# Patient Record
Sex: FEMALE | Race: WHITE | URBAN - METROPOLITAN AREA
[De-identification: names, ages, dates, MRNs, and addresses within clinical notes are randomized per-mention and may not be internally consistent; named-entity substitution may affect disease eponyms.]

---

## 2021-07-22 ENCOUNTER — OFFICE VISIT (OUTPATIENT)
Dept: URBAN - METROPOLITAN AREA CLINIC 115 | Facility: CLINIC | Age: 21
End: 2021-07-22
Payer: COMMERCIAL

## 2021-07-22 ENCOUNTER — WEB ENCOUNTER (OUTPATIENT)
Dept: URBAN - METROPOLITAN AREA CLINIC 115 | Facility: CLINIC | Age: 21
End: 2021-07-22

## 2021-07-22 DIAGNOSIS — R10.31 RIGHT LOWER QUADRANT PAIN: ICD-10-CM

## 2021-07-22 DIAGNOSIS — R74.8 ELEVATED LIVER ENZYMES: ICD-10-CM

## 2021-07-22 DIAGNOSIS — E66.9 OBESITY (BMI 30-39.9): ICD-10-CM

## 2021-07-22 DIAGNOSIS — K21.9 GASTROESOPHAGEAL REFLUX DISEASE WITHOUT ESOPHAGITIS: ICD-10-CM

## 2021-07-22 PROCEDURE — G9903 PT SCRN TBCO ID AS NON USER: HCPCS | Performed by: INTERNAL MEDICINE

## 2021-07-22 PROCEDURE — 99244 OFF/OP CNSLTJ NEW/EST MOD 40: CPT | Performed by: INTERNAL MEDICINE

## 2021-07-22 PROCEDURE — 1036F TOBACCO NON-USER: CPT | Performed by: INTERNAL MEDICINE

## 2021-07-22 PROCEDURE — G8417 CALC BMI ABV UP PARAM F/U: HCPCS | Performed by: INTERNAL MEDICINE

## 2021-07-22 PROCEDURE — G8427 DOCREV CUR MEDS BY ELIG CLIN: HCPCS | Performed by: INTERNAL MEDICINE

## 2021-07-22 RX ORDER — OMEPRAZOLE 10 MG/1
1 CAPSULE 30 MINUTES BEFORE MORNING MEAL CAPSULE, DELAYED RELEASE ORAL ONCE A DAY
Status: ACTIVE | COMMUNITY

## 2021-07-22 RX ORDER — DICYCLOMINE HYDROCHLORIDE 10 MG/1
1 TABLET CAPSULE ORAL THREE TIMES A DAY
Qty: 90 | Refills: 1 | OUTPATIENT
Start: 2021-07-22 | End: 2021-09-20

## 2021-07-22 NOTE — HPI-TODAY'S VISIT:
07/22/2021 Patient is a 20 year old   female who presents on referral from Dr. Jack Claros for evaluation of abdominal pain. A copy of the note will be sent to the referring provider. Patient c/o RLQ abdominal pain for the past 2 months, and she reports labs with her PCP showed abnormal liver enzymes. She is scheduled to have follow up with Dr. Claros to discuss lab results. The abdominal pain significantly improved on omeprazole for the past month. Without omeprazole, she has a recurrence of pain and also bowel urgency. No family history of colon cancer, stomach cancer, gallbladder conditions, Crohn's disease, or gluten intolerance. Patient admits lactose intolerance and certain brands of foods cause her irritation. Denies any melena or hematochezia. No diarrhea or constipation.

## 2021-07-22 NOTE — PHYSICAL EXAM GASTROINTESTINAL
Abdomen , soft, nontender, nondistended , no guarding or rigidity , no masses palpable , hyperactive bowel sounds , Liver and Spleen , no hepatomegaly present , no hepatosplenomegaly , liver nontender , spleen not palpable

## 2021-12-02 ENCOUNTER — OFFICE VISIT (OUTPATIENT)
Dept: URBAN - METROPOLITAN AREA CLINIC 115 | Facility: CLINIC | Age: 21
End: 2021-12-02
Payer: COMMERCIAL

## 2021-12-02 ENCOUNTER — LAB OUTSIDE AN ENCOUNTER (OUTPATIENT)
Dept: URBAN - METROPOLITAN AREA CLINIC 115 | Facility: CLINIC | Age: 21
End: 2021-12-02

## 2021-12-02 ENCOUNTER — DASHBOARD ENCOUNTERS (OUTPATIENT)
Age: 21
End: 2021-12-02

## 2021-12-02 DIAGNOSIS — E66.9 OBESITY (BMI 30-39.9): ICD-10-CM

## 2021-12-02 DIAGNOSIS — K21.9 GASTROESOPHAGEAL REFLUX DISEASE WITHOUT ESOPHAGITIS: ICD-10-CM

## 2021-12-02 DIAGNOSIS — R74.8 ELEVATED LIVER ENZYMES: ICD-10-CM

## 2021-12-02 DIAGNOSIS — R10.9 ABDOMINAL PAIN: ICD-10-CM

## 2021-12-02 PROBLEM — 266435005: Status: ACTIVE | Noted: 2021-07-22

## 2021-12-02 PROBLEM — 21522001 ABDOMINAL PAIN: Status: ACTIVE | Noted: 2021-07-22

## 2021-12-02 PROBLEM — 707724006: Status: ACTIVE | Noted: 2021-07-22

## 2021-12-02 PROBLEM — 162864005: Status: ACTIVE | Noted: 2021-07-22

## 2021-12-02 PROCEDURE — G9903 PT SCRN TBCO ID AS NON USER: HCPCS | Performed by: INTERNAL MEDICINE

## 2021-12-02 PROCEDURE — G8417 CALC BMI ABV UP PARAM F/U: HCPCS | Performed by: INTERNAL MEDICINE

## 2021-12-02 PROCEDURE — 99214 OFFICE O/P EST MOD 30 MIN: CPT | Performed by: INTERNAL MEDICINE

## 2021-12-02 PROCEDURE — G8427 DOCREV CUR MEDS BY ELIG CLIN: HCPCS | Performed by: INTERNAL MEDICINE

## 2021-12-02 RX ORDER — DICYCLOMINE HYDROCHLORIDE 10 MG/1
2 CAPSULES CAPSULE ORAL THREE TIMES A DAY
Status: ACTIVE | COMMUNITY

## 2021-12-02 RX ORDER — OMEPRAZOLE 40 MG/1
1 CAPSULE 30 MINUTES BEFORE MORNING MEAL CAPSULE, DELAYED RELEASE ORAL ONCE A DAY
Qty: 90 | Refills: 1 | OUTPATIENT
Start: 2021-12-02

## 2021-12-02 RX ORDER — OMEPRAZOLE 10 MG/1
1 CAPSULE 30 MINUTES BEFORE MORNING MEAL CAPSULE, DELAYED RELEASE ORAL ONCE A DAY
Status: ACTIVE | COMMUNITY

## 2021-12-02 RX ORDER — DICYCLOMINE HYDROCHLORIDE 10 MG/1
1 TABLET CAPSULE ORAL THREE TIMES A DAY
Qty: 270 TABLET | Refills: 1 | OUTPATIENT
Start: 2021-12-02 | End: 2022-05-31

## 2021-12-02 NOTE — HPI-TODAY'S VISIT:
07/22/2021 Patient is a 20 year old   female who presents on referral from Dr. Jack Claros for evaluation of abdominal pain. A copy of the note will be sent to the referring provider. Patient c/o RLQ abdominal pain for the past 2 months, and she reports labs with her PCP showed abnormal liver enzymes. She is scheduled to have follow up with Dr. Claros to discuss lab results. The abdominal pain significantly improved on omeprazole for the past month. Without omeprazole, she has a recurrence of pain and also bowel urgency. No family history of colon cancer, stomach cancer, gallbladder conditions, Crohn's disease, or gluten intolerance. Patient admits lactose intolerance and certain brands of foods cause her irritation. Denies any melena or hematochezia. No diarrhea or constipation.  12/2/2021 Patient reports symptoms of abdominal pain, bloating, and GERD have improved on Omeprazole 10mg once a day and Dicyclomine 10mg tid. She also noticed improvement with dietary changes and exercise. Her bowel movements have also normalized now. No known family history of colitis or colon cancer. No prior US with Dr. Claros. Thyroid labs in the past were normal as per her.

## 2021-12-03 LAB
A/G RATIO: 1.8
ALBUMIN: 4.4
ALKALINE PHOSPHATASE: 132
ALT (SGPT): 26
AST (SGOT): 17
BASO (ABSOLUTE): 0.1
BASOS: 1
BILIRUBIN, TOTAL: 0.2
BUN/CREATININE RATIO: 16
BUN: 12
CALCIUM: 9.1
CARBON DIOXIDE, TOTAL: 21
CHLORIDE: 105
CHOLESTEROL, TOTAL: 177
COMMENT:: (no result)
CREATININE: 0.74
EGFR IF AFRICN AM: 132
EGFR IF NONAFRICN AM: 115
EOS (ABSOLUTE): 0.1
EOS: 1
GLOBULIN, TOTAL: 2.4
GLUCOSE: 87
HDL CHOLESTEROL: 41
HEMATOCRIT: 40.4
HEMATOLOGY COMMENTS:: (no result)
HEMOGLOBIN: 12.9
IMMATURE CELLS: (no result)
IMMATURE GRANS (ABS): 0
IMMATURE GRANULOCYTES: 0
LDL CHOL CALC (NIH): 119
LYMPHS (ABSOLUTE): 2.3
LYMPHS: 29
MCH: 25.5
MCHC: 31.9
MCV: 80
MONOCYTES(ABSOLUTE): 0.6
MONOCYTES: 7
NEUTROPHILS (ABSOLUTE): 5
NEUTROPHILS: 62
NRBC: (no result)
PLATELETS: 371
POTASSIUM: 4.2
PROTEIN, TOTAL: 6.8
RBC: 5.06
RDW: 14
SODIUM: 141
TRIGLYCERIDES: 90
VLDL CHOLESTEROL CAL: 17
WBC: 8.1

## 2022-03-03 ENCOUNTER — OFFICE VISIT (OUTPATIENT)
Dept: URBAN - METROPOLITAN AREA CLINIC 115 | Facility: CLINIC | Age: 22
End: 2022-03-03

## 2024-08-09 ENCOUNTER — OFFICE VISIT (OUTPATIENT)
Dept: URBAN - METROPOLITAN AREA CLINIC 115 | Facility: CLINIC | Age: 24
End: 2024-08-09
Payer: COMMERCIAL

## 2024-08-09 VITALS
BODY MASS INDEX: 50.94 KG/M2 | WEIGHT: 269.8 LBS | DIASTOLIC BLOOD PRESSURE: 73 MMHG | SYSTOLIC BLOOD PRESSURE: 116 MMHG | HEIGHT: 61 IN | TEMPERATURE: 98 F | HEART RATE: 62 BPM

## 2024-08-09 DIAGNOSIS — A04.8 OTHER SPECIFIED BACTERIAL INTESTINAL INFECTIONS: ICD-10-CM

## 2024-08-09 DIAGNOSIS — R13.19 OTHER DYSPHAGIA: ICD-10-CM

## 2024-08-09 DIAGNOSIS — R11.0 NAUSEA: ICD-10-CM

## 2024-08-09 PROBLEM — 721730009: Status: ACTIVE | Noted: 2024-08-09

## 2024-08-09 PROCEDURE — 99203 OFFICE O/P NEW LOW 30 MIN: CPT

## 2024-08-09 RX ORDER — METRONIDAZOLE 500 MG/1
1 TABLET TABLET ORAL THREE TIMES A DAY
Status: ACTIVE | COMMUNITY

## 2024-08-09 RX ORDER — DICYCLOMINE HYDROCHLORIDE 10 MG/1
2 CAPSULES CAPSULE ORAL THREE TIMES A DAY
Status: ACTIVE | COMMUNITY

## 2024-08-09 RX ORDER — CLARITHROMYCIN 500 MG/1
1 TABLET TABLET, FILM COATED ORAL
Status: ACTIVE | COMMUNITY

## 2024-08-09 RX ORDER — PANTOPRAZOLE SODIUM 40 MG/1
1 TABLET TABLET, DELAYED RELEASE ORAL ONCE A DAY
Status: ACTIVE | COMMUNITY

## 2024-08-09 RX ORDER — OMEPRAZOLE 40 MG/1
1 CAPSULE 30 MINUTES BEFORE MORNING MEAL CAPSULE, DELAYED RELEASE ORAL ONCE A DAY
Qty: 90 | Refills: 1 | Status: ACTIVE | COMMUNITY
Start: 2021-12-02

## 2024-08-09 RX ORDER — OMEPRAZOLE 10 MG/1
1 CAPSULE 30 MINUTES BEFORE MORNING MEAL CAPSULE, DELAYED RELEASE ORAL ONCE A DAY
Status: ACTIVE | COMMUNITY

## 2024-08-09 NOTE — PHYSICAL EXAM GASTROINTESTINAL
Abdomen , soft, epigastric/suprapubic tenderness, elicited gag reflex with palpation of abdomen, nondistended , no guarding or rigidity , no masses palpable

## 2024-08-09 NOTE — HPI-TODAY'S VISIT:
24 y/o F with PMH of GERD, HLD pt of Dr. Temple presents with c/o vomiting.  Previously was on bentyl and omeprazole 40mg. Now taking pantoprazole 40mg. States starting at the end of June, she would gag with any PO intake and sometimes smells from the [employer] she works at, even water could not figure out any triggers. Started portion controlling which also did not help. Had one episode of vomiting after eating chips/salsa. Took a breath test which was abnormal; states pantoprazole 40mg helps her stomach food. Has been rx'd metronidazole 500mg, clarithromycin 500mg but did not start the medication. BMs are daily. Does note some stomach upset with eating spicy foods. Sometimes with dysphagia.  Denies abdominal pain, diarrhea, constipation, blood in stool, unintentional weight loss, change in appetite, early satiety. Denies NSAID use. Denies EtOH/tobacco/marijuana use. Vapes nicotine. Denies PMH of MI, stroke, seizures, BiPAP use, pacemaker/defibrillator, blood thinners, ESRD.

## 2024-10-08 ENCOUNTER — OFFICE VISIT (OUTPATIENT)
Dept: URBAN - METROPOLITAN AREA CLINIC 115 | Facility: CLINIC | Age: 24
End: 2024-10-08